# Patient Record
Sex: FEMALE | Race: WHITE | NOT HISPANIC OR LATINO | URBAN - METROPOLITAN AREA
[De-identification: names, ages, dates, MRNs, and addresses within clinical notes are randomized per-mention and may not be internally consistent; named-entity substitution may affect disease eponyms.]

---

## 2017-02-10 NOTE — PATIENT DISCUSSION
GLAUCOMA SUSPECT OU:  ORDER RNFL AND 30-2 TO BE INTERPRETED FOR POSSIBLE STENTS BY DR Lisa Burroughs

## 2017-03-13 NOTE — PATIENT DISCUSSION
Surgery  Counseling: I have discussed the option of glasses versus cataract surgery versus following. It was explained that when vision no longer meets the patient&rsquo;s visual needs and a new prescription for glasses is not likely to improve the patient&rsquo;s visual symptoms, the option of cataract surgery is a reasonable next step. It was explained that there is no guarantee that removing the cataract will improve their visual symptoms, however; it is believed that the cataract is contributing to the patient's visual impairment and surgery may significantly improve both the visual and functional status of the patient. The risks, benefits and alternatives of surgery were discussed with the patient. After this discussion, the patient desires to proceed with cataract surgery with implantation of an intraocular lens to improve vision to drive safely and watch TV and reduce glare.

## 2017-03-13 NOTE — PATIENT DISCUSSION
CATARACT, OU - VISUALLY SIGNIFICANT. SCHEDULE SX OD THEN LATER IN OS IF VISUAL SYMPTOMS PERSIST.  GLS RX GIVEN TO FILL IF DESIRES IN THE EVENT PT DOES NOT PROCEED W/ SX.

## 2017-03-13 NOTE — PATIENT DISCUSSION
POAG, OU- IOP WNL, OU. RX COMBIGAN BID, OU (SAMPLE GIVEN). RECOMMEND ISTENT, OU AT THE TIME OF CAT SX. CONSIDER D/C GTT S/P ISTENT. FOLLOW.

## 2017-03-13 NOTE — PATIENT DISCUSSION
"Surgery Drops: I have given the patient the option to chose whether they would like a prescribed regimen of drops or an all-in-one drop for use before and after cataract surgery. They have elected to use the all-in-one option of Pred-Gati-Nepaf (prednisolone acetate, gatifloxacin and nepafenac). Patient to administer ""as directed"". "

## 2017-03-13 NOTE — PATIENT DISCUSSION
Primary Open Angle Glaucoma (POAG) /iStent Counseling:  Primary Open Angle Glaucoma is a progressive condition and is the most common cause of irreversible blindness worldwide. POAG is a subset of the glaucomas defined by an open, normal appearing anterior chamber angle and raised intraocular pressure. I have discussed the various treatment options including medications and surgery. I have discussed the option of iStent micro-invasive glaucoma surgery device at the time of cataract surgery should visual field testing indicate field loss. I have discussed the risks and benefits associated with iStent insertion at the time of cataract surgery. Patient understands and wishes to consider.

## 2017-03-13 NOTE — PATIENT DISCUSSION
Refractive Error Counseling, OU -  I have discussed the options for refractive surgery to decrease dependency on glasses and/or contact lenses after cataract surgery. It was discussed that while the success rate of cataract surgery is high (approximately 99 percent), success being defined as removing a cloudy lens and replacing it with a clear artificial lens, the success rate of getting the prescription corrected to meet or approximate the patients goals (within 0.5 diopters) is lower (about 75-80%). These options include: correction of astigmatism with limbal relaxing incision(s), LenSx arcuate incision(s) and/or TORIC IOL, monovision, multifocal IOL, and accommodative IOL . It was emphasized to the patient that the goal of reducing spectacle dependence not spectacle freedom is more realistic. The patient understands it is possible they may still need a weak spectacle prescription and/or further refractive surgery to achieve their visual goal after surgery. It was explained that in some cases further refractive surgery may not be possible and glasses and/or contacts may be needed to achieve their best vision. No visual outcome was guaranteed.

## 2017-07-06 NOTE — PATIENT DISCUSSION
Surgery  Counseling: I have discussed the option of glasses versus cataract surgery versus following . It was explained that when vision no longer meets the patient&rsquo;s visual needs and a new prescription for glasses is not likely to improve all of the patient&rsquo;s visual symptoms, the option of cataract surgery is a reasonable next step. It was explained that there is no guarantee that removing the cataract will improve their visual symptoms, however; it is believed that the cataract is contributing to the patient's visual impairment and surgery may significantly improve both the visual and functional status of the patient. The risks, benefits and alternatives of surgery were discussed with the patient. After this discussion, the patient desires to proceed with cataract surgery with implantation of an intraocular lens to improve vision for distance and reading.

## 2020-08-26 NOTE — PATIENT DISCUSSION
COUNSELING: [HRA Reviewed] : Health risk assessment reviewed [Some assistance needed] : dressing [Independent] : managing medications [Full assistance needed] : using transportation [Yes] : The patient does have visual impairment [Two or more falls in past year] : Patient reported two or more falls in the past year [de-identified] : refused cane/walker, PT [TimeGetUpGo] : 30

## 2022-07-25 ENCOUNTER — NEW PATIENT (OUTPATIENT)
Dept: URBAN - METROPOLITAN AREA CLINIC 20 | Facility: CLINIC | Age: 77
End: 2022-07-25

## 2022-07-25 DIAGNOSIS — H04.123: ICD-10-CM

## 2022-07-25 PROCEDURE — 99203 OFFICE O/P NEW LOW 30 MIN: CPT

## 2022-07-25 RX ORDER — FLUOROMETHOLONE 1 MG/ML: 1 SUSPENSION/ DROPS OPHTHALMIC TWICE A DAY

## 2022-07-25 ASSESSMENT — VISUAL ACUITY
OS_SC: 20/40
OS_PH: 20/40
OU_SC: 20/40
OD_PH: 20/40
OD_SC: 20/40

## 2022-07-25 ASSESSMENT — TONOMETRY
OS_IOP_MMHG: 13
OD_IOP_MMHG: 13

## 2022-07-25 ASSESSMENT — KERATOMETRY
OD_K2POWER_DIOPTERS: 45.75
OS_K2POWER_DIOPTERS: 46.25
OD_K1POWER_DIOPTERS: 45.75
OS_AXISANGLE_DEGREES: 26
OS_K1POWER_DIOPTERS: 45.50
OD_AXISANGLE_DEGREES: 0
OS_AXISANGLE2_DEGREES: 116
OD_AXISANGLE2_DEGREES: 90

## 2022-08-08 ENCOUNTER — FOLLOW UP (OUTPATIENT)
Dept: URBAN - METROPOLITAN AREA CLINIC 20 | Facility: CLINIC | Age: 77
End: 2022-08-08

## 2022-08-08 DIAGNOSIS — H04.123: ICD-10-CM

## 2022-08-08 PROCEDURE — 99213 OFFICE O/P EST LOW 20 MIN: CPT

## 2022-08-08 PROCEDURE — 68761 CLOSE TEAR DUCT OPENING: CPT

## 2022-08-08 ASSESSMENT — VISUAL ACUITY
OD_SC: 20/40
OS_SC: 20/40

## 2022-08-08 ASSESSMENT — KERATOMETRY
OD_K2POWER_DIOPTERS: 45.75
OS_K2POWER_DIOPTERS: 46.25
OS_AXISANGLE_DEGREES: 26
OD_AXISANGLE_DEGREES: 0
OD_AXISANGLE2_DEGREES: 90
OD_K1POWER_DIOPTERS: 45.75
OS_K1POWER_DIOPTERS: 45.50
OS_AXISANGLE2_DEGREES: 116

## 2022-08-08 ASSESSMENT — TONOMETRY
OD_IOP_MMHG: 13
OS_IOP_MMHG: 13

## 2022-09-23 ENCOUNTER — FOLLOW UP (OUTPATIENT)
Dept: URBAN - METROPOLITAN AREA CLINIC 20 | Facility: CLINIC | Age: 77
End: 2022-09-23

## 2022-09-23 DIAGNOSIS — H04.123: ICD-10-CM

## 2022-09-23 PROCEDURE — 99213 OFFICE O/P EST LOW 20 MIN: CPT

## 2022-09-23 ASSESSMENT — VISUAL ACUITY
OD_SC: 20/30-1
OS_SC: 20/30+2
OU_SC: 20/25+1

## 2022-09-23 ASSESSMENT — TONOMETRY
OS_IOP_MMHG: 11
OD_IOP_MMHG: 11

## 2023-12-05 ENCOUNTER — LAB OUTSIDE AN ENCOUNTER (OUTPATIENT)
Dept: URBAN - METROPOLITAN AREA CLINIC 72 | Facility: CLINIC | Age: 78
End: 2023-12-05

## 2023-12-05 ENCOUNTER — OFFICE VISIT (OUTPATIENT)
Dept: URBAN - METROPOLITAN AREA CLINIC 72 | Facility: CLINIC | Age: 78
End: 2023-12-05
Payer: COMMERCIAL

## 2023-12-05 VITALS
DIASTOLIC BLOOD PRESSURE: 77 MMHG | HEART RATE: 80 BPM | WEIGHT: 158.6 LBS | TEMPERATURE: 97.3 F | HEIGHT: 60 IN | SYSTOLIC BLOOD PRESSURE: 111 MMHG | BODY MASS INDEX: 31.14 KG/M2

## 2023-12-05 DIAGNOSIS — K62.5 BRIGHT RED RECTAL BLEEDING: ICD-10-CM

## 2023-12-05 DIAGNOSIS — R19.4 ALTERED BOWEL HABITS: ICD-10-CM

## 2023-12-05 DIAGNOSIS — Z98.890 HISTORY OF RECTAL SURGERY: ICD-10-CM

## 2023-12-05 PROCEDURE — 99204 OFFICE O/P NEW MOD 45 MIN: CPT | Performed by: NURSE PRACTITIONER

## 2023-12-05 RX ORDER — LAMOTRIGINE 100 MG/1
1 TABLET TABLET ORAL
Status: ACTIVE | COMMUNITY

## 2023-12-05 RX ORDER — CITALOPRAM 40 MG/1
0.5 TABLET TABLET, FILM COATED ORAL ONCE A DAY
Status: ACTIVE | COMMUNITY

## 2023-12-05 NOTE — HPI-TODAY'S VISIT:
78-year-old female new to the clinic referred by TWILA Mcwilliams, for constipation.  A copy of this note will be sent to the referring provider.    Past medical history of brain tumor in 1984 and had a seizure at that time.  None since.  In September had displaced fracture of right patella.  Recently at [facility] ER 11/10/23  for toe pain discharged with antibiotics for paronychia.  She reports symptoms intermittently for most of her life. She develops constipation.  She tries to eat healthy.  A year ago she had to have rectal surgery and was told if she didn't have the surgery she would "burst."  She cannot remember where it was or what happened.  She states her  would know.    Reports bowel habits both diarrhea and constipation. Consipation is primary but will have diarrhea with laxatives.  Does have urgency, hemorrhoids with rare BRBPR. She cannot recall when she last had bleeding. No rectal pain currently.  Last BM was today and was type 1-2 on the bristol scale. No bleeding today or melena.  She started colace and miralax which caused diarrhea.  Tried fiber at home but is not taking it currently. No GERD or dysphagia currently. No abdominal pain.   No CP, SOB, palpitations, syncope, near-syncope or problems with anesthesia previously.   Last colonoscopy was year ago.  Was told she didn't need it repeated.  Was performed in [place].

## 2024-01-05 ENCOUNTER — OFFICE VISIT (OUTPATIENT)
Dept: URBAN - METROPOLITAN AREA CLINIC 72 | Facility: CLINIC | Age: 79
End: 2024-01-05
Payer: COMMERCIAL

## 2024-01-05 VITALS
SYSTOLIC BLOOD PRESSURE: 111 MMHG | HEIGHT: 60 IN | HEART RATE: 80 BPM | WEIGHT: 162.4 LBS | TEMPERATURE: 96.9 F | DIASTOLIC BLOOD PRESSURE: 64 MMHG | BODY MASS INDEX: 31.88 KG/M2

## 2024-01-05 DIAGNOSIS — K64.4 SKIN TAG OF RECTUM: ICD-10-CM

## 2024-01-05 DIAGNOSIS — K62.5 BRIGHT RED RECTAL BLEEDING: ICD-10-CM

## 2024-01-05 DIAGNOSIS — R19.4 ALTERED BOWEL HABITS: ICD-10-CM

## 2024-01-05 DIAGNOSIS — Z98.890 HISTORY OF RECTAL SURGERY: ICD-10-CM

## 2024-01-05 PROCEDURE — 99214 OFFICE O/P EST MOD 30 MIN: CPT | Performed by: NURSE PRACTITIONER

## 2024-01-05 PROCEDURE — 46600 DIAGNOSTIC ANOSCOPY SPX: CPT | Performed by: NURSE PRACTITIONER

## 2024-01-05 RX ORDER — CITALOPRAM 40 MG/1
0.5 TABLET TABLET, FILM COATED ORAL ONCE A DAY
Status: ACTIVE | COMMUNITY

## 2024-01-05 RX ORDER — LAMOTRIGINE 100 MG/1
1 TABLET TABLET ORAL
Status: ACTIVE | COMMUNITY

## 2024-01-05 NOTE — EXAM-PHYSICAL EXAM
External skin tag and pinpoint areas of skin breakdown on buttocks.  Anoscopy: No obvious hemorrhoids, rectal masses, or bleeding present. Solid green/brown stool MAO: Decreased sphincter tone and rectal muscle upon bearing down. No tenderness on exam.  Chaperone present.

## 2024-01-05 NOTE — HPI-TODAY'S VISIT:
78-year-old female here for a follow-up appointment.    Last seen 12/5/2023 for constipation, rectal bleeding with history of rectal surgery.  She was not able to provide much history at her visit.  Chronic constipation primarily.  Fiber and MiraLAX recommended daily titrate to effect.  Colonoscopy ordered for further evaluation.  Records requested. Colonoscopy scheduled for 3/7/2024.  Past medical history of brain tumor in 1984 and had a seizure at that time.  None since.  In September had displaced fracture of right patella.  She reports symptoms intermittently for most of her life. She develops constipation.  She tries to eat healthy.  A year ago she had to have rectal surgery and was told if she didn't have the surgery she would "burst."  She cannot remember where it was or what happened.  She states her  would know.    Today she reports itching to her rectum. She is on prep-H cream.  She did not try the miralax or fiber.  She just figured out where she was seen in [place] last for rectal bleeding-states it was at the ER 3-4 years ago.  She reports soft stool.  She is not on fiber or mirlax. No abddominal pain.  Has not had any bleeding since her last visit.  Weight is up 4 pounds from her last appointment.   Last colonoscopy was around 3 years ago. Was told she didn't need it repeated.  Was performed in [place].

## 2024-03-07 ENCOUNTER — COLON (OUTPATIENT)
Dept: URBAN - METROPOLITAN AREA MEDICAL CENTER 40 | Facility: MEDICAL CENTER | Age: 79
End: 2024-03-07
Payer: COMMERCIAL

## 2024-03-07 DIAGNOSIS — K62.5 ANAL BLEEDING: ICD-10-CM

## 2024-03-07 DIAGNOSIS — D12.8 RECTAL BENIGN NEOPLASM: ICD-10-CM

## 2024-03-07 PROCEDURE — 45380 COLONOSCOPY AND BIOPSY: CPT | Performed by: INTERNAL MEDICINE

## 2024-03-07 RX ORDER — CITALOPRAM 40 MG/1
0.5 TABLET TABLET, FILM COATED ORAL ONCE A DAY
Status: ACTIVE | COMMUNITY

## 2024-03-07 RX ORDER — LAMOTRIGINE 100 MG/1
1 TABLET TABLET ORAL
Status: ACTIVE | COMMUNITY

## 2024-03-21 ENCOUNTER — OV EP (OUTPATIENT)
Dept: URBAN - METROPOLITAN AREA CLINIC 72 | Facility: CLINIC | Age: 79
End: 2024-03-21
Payer: COMMERCIAL

## 2024-03-21 VITALS
WEIGHT: 147.4 LBS | DIASTOLIC BLOOD PRESSURE: 60 MMHG | HEIGHT: 60 IN | HEART RATE: 75 BPM | TEMPERATURE: 97.1 F | SYSTOLIC BLOOD PRESSURE: 125 MMHG | BODY MASS INDEX: 28.94 KG/M2

## 2024-03-21 DIAGNOSIS — I95.9 HYPOTENSION, UNSPECIFIED HYPOTENSION TYPE: ICD-10-CM

## 2024-03-21 DIAGNOSIS — Z86.010 HISTORY OF COLON POLYPS: ICD-10-CM

## 2024-03-21 PROBLEM — 428283002: Status: ACTIVE | Noted: 2024-03-21

## 2024-03-21 PROCEDURE — 99213 OFFICE O/P EST LOW 20 MIN: CPT | Performed by: NURSE PRACTITIONER

## 2024-03-21 RX ORDER — LAMOTRIGINE 100 MG/1
1 TABLET TABLET ORAL
Status: ACTIVE | COMMUNITY

## 2024-03-21 RX ORDER — CITALOPRAM 40 MG/1
0.5 TABLET TABLET, FILM COATED ORAL ONCE A DAY
Status: ON HOLD | COMMUNITY

## 2024-03-21 NOTE — HPI-TODAY'S VISIT:
78-year-old female here for colonoscopy follow-up.    Last seen 12/5/2023 for constipation, rectal bleeding with history of rectal surgery.  She was not able to provide much history at her visit.  Chronic constipation primarily.  Fiber and MiraLAX recommended daily titrate to effect.  Colonoscopy ordered for further evaluation.  Records requested. Colonoscopy scheduled for 3/7/2024.  Past medical history of brain tumor in 1984 and had a seizure at that time.  None since.  In September had displaced fracture of right patella.  She reports symptoms intermittently for most of her life. She develops constipation.  She tries to eat healthy.  A year ago she had to have rectal surgery and was told if she didn't have the surgery she would "burst."  She cannot remember where it was or what happened.  She states her  would know.    Todays she is doing well from a GI standpoint.  She reports the weight loss is intentional.  No further rectal discomfort or itching.  She has 2 BM's a week, but no abdominal pain, bloating or nausea.  She is on a weight loss medication. Weight is down 15 pounds from her last appointment.  She is feeling a little dizzy, she doesn't drink much fluids.   Ana Nunn [facility] is her PCP

## 2024-03-21 NOTE — HPI-OTHER HISTORIES
Labs 1/17/2023. CBC: Normal with Hgb 12.8. CMP: Sodium 134. Labs 8/23/2023. CBC: Hgb 13.4.  . Colonoscopy 3/7/2024 revealed 3 mm polyp in rectum which was removed.  Bowel prep fair.  Rectal polyp revealed benign squamous papilloma with no evidence of malignancy.   . CT abdomen and pelvis with contrast 12/21/2020.  High-grade small bowel obstruction related to incarcerated umbilical hernia.  Status post cholecystectomy.  Normal pancreas liver, stomach esophagus, and duodenum